# Patient Record
Sex: MALE | Race: ASIAN | URBAN - METROPOLITAN AREA
[De-identification: names, ages, dates, MRNs, and addresses within clinical notes are randomized per-mention and may not be internally consistent; named-entity substitution may affect disease eponyms.]

---
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
## 2020-05-11 ENCOUNTER — VIRTUAL VISIT (OUTPATIENT)
Dept: FAMILY MEDICINE | Facility: OTHER | Age: 20
End: 2020-05-11

## 2020-05-12 NOTE — PROGRESS NOTES
"Date: 2020 16:28:50  Clinician: Chalo Mohan  Clinician NPI: [id number]  Patient: NATIVIDAD Palacios  Patient : 2000  Patient Address: [facility], [place] [address]  Patient Phone: [phone]  Visit Protocol: URI  Patient Summary:  NATIVIDAD HARVEY is a 20 year old ( : 2000 ) male who initiated a Visit for COVID-19 (Coronavirus) evaluation and screening. When asked the question \"Please sign me up to receive news, health information and promotions from [employer].\", NATIVIDAD HARVEY responded \"No\".    NATIVIDAD HARVEY states his symptoms started gradually 7-9 days ago.   His symptoms consist of myalgia, rhinitis, a cough, nasal congestion, and a headache. He is experiencing mild difficulty breathing with activities but can speak normally in full sentences.   Symptom details     Nasal secretions: The color of his mucus is blood-tinged and clear.    Cough: NATIVIDAD HARVEY coughs a few times an hour and his cough is not more bothersome at night. Phlegm does not come into his throat when he coughs. He does not believe his cough is caused by post-nasal drip.     Headache: He states the headache is mild (1-3 on a 10 point pain scale).      NATIVIDAD HARVEY denies having fever, facial pain or pressure, sore throat, vomiting, nausea, teeth pain, ageusia, anosmia, diarrhea, ear pain, malaise, wheezing, enlarged lymph nodes, and chills. He also denies taking antibiotic medication for the symptoms, double sickening (worsening symptoms after initial improvement), and having recent facial or sinus surgery in the past 60 days.   Precipitating events  He has not recently been exposed to someone with influenza. NATIVIDAD HARVEY has been in close contact with the following high risk individuals: children under the age of 5.   Pertinent COVID-19 (Coronavirus) information  NATIVIDAD HRAVEY does not work or volunteer as healthcare worker or a  and does not work or volunteer in a healthcare facility.   He lives with a healthcare worker.   " NATIVIDAD HARVEY has had a close contact with a laboratory-confirmed COVID-19 patient within 14 days of symptom onset. He has not had a close contact with a suspected COVID-19 patient within 14 days of symptom onset. Additional information about contact with COVID-19 (Coronavirus) patient as reported by the patient (free text): My mom was exposed on 4/30/2020, and she tested positive on 5/8/2020.   Pertinent medical history  NATIVIDAD HARVEY needs a return to work/school note.   Weight: 215 lbs   NATIVIDAD HARVEY does not smoke or use smokeless tobacco.   Weight: 215 lbs    MEDICATIONS: No current medications, ALLERGIES: NKDA  Clinician Response:  Dear NATIVIDAD HARVEY,   Dear NATIVIDAD HARVEY  Your symptoms show that you may have coronavirus (COVID-19). This illness can cause fever, cough and trouble breathing. Many people get a mild case and get better on their own. Some people can get very sick.   Will I be tested for COVID-19?  Because we have limited testing supplies, we do not test everyone who is at low risk. We are testing if:    You are very ill. For example, you're on chemotherapy, dialysis or home hospice care. (Contact your specialty clinic or program.)   You live in a nursing home or other long-term care facility. (Talk to your nurse manager or medical director.)   You're a health care worker. ([facility] employees contact our employee health office for testing.)   We are performing limited curbside testing for healthcare/first responders and people with medical problems that put them at increased risk. It does not appear by the [facility] information you submitted that you meet any of these criteria. If there are medical problems that we did not know about, please repeat an [facility] visit and let us know what medical conditions you have.   How can I protect others?  Without a test, we can't know for sure that you have COVID-19. For safety, it's very important to follow these rules.  First, stay home and away from others (self-isolate)  until:   You've had no fever---and no medicine that reduces fever---for 3 full days (72 hours). And...    Your other symptoms have gotten better. For example, your cough or breathing has improved. And...   At least 10 days have passed since your symptoms started.   During this time:   Don't go to work, school or anywhere else.    Stay away from others in your home. No hugging, kissing or shaking hands.   Don't let anyone visit.   Cover your mouth and nose with a mask, tissue or wash cloth to avoid spreading germs.   Wash your hands and face often. Use soap and water.   How can I take care of myself?   1.Take Tylenol (acetaminophen) for fever or pain. If you have liver or kidney problems, ask your family doctor if it's okay to take Tylenol.        Adults can take either:    650 mg (two 325 mg pills) every 4 to 6 hours, or...   1,000 mg (two 500 mg pills) every 8 hours as needed.    Note: Don't take more than 3,000 mg in one day.   For children, check the Tylenol bottle for the right dose. The dose is based on the child's age or weight.   2.If you have other health problems (like cancer, heart failure, an organ transplant or severe kidney disease): Call your specialty clinic if you don't feel better in the next 2 days.       3.Know when to call 911: If your breathing is so bad that it keeps you from doing normal activities, call 911 or go to the emergency room. Tell them that you've been staying home and may have COVID-19.   Where can I get more information?  To learn more about COVID-19 and how to care for yourself at home, please visit the CDC website at https://www.cdc.gov/coronavirus/2019-ncov/about/steps-when-sick.html.  For more about your care at [facility], please visit [email or web address]   If you are interested in becoming part of a [facility] clinic trial related to COVID19 please go to [email or web address] for information, if you qualify.     Diagnosis:  Acute maxillary sinusitis, unspecified  Diagnosis ICD: J01.00  Prescription: amoxicillin 500 mg oral capsule 30 capsule, 10 days supply. Take 1 capsule by mouth every 8 hours for 10 days. Refills: 0, Refill as needed: no, Allow substitutions: yes  Prescription: benzonatate (Tessalon Perles) 100 mg oral capsule 21 capsule, 7 days supply. Take 1 capsule by mouth 3 times per day for 7 days as needed. Refills: 0, Refill as needed: no, Allow substitutions: yes